# Patient Record
Sex: FEMALE | Race: WHITE
[De-identification: names, ages, dates, MRNs, and addresses within clinical notes are randomized per-mention and may not be internally consistent; named-entity substitution may affect disease eponyms.]

---

## 2019-09-11 ENCOUNTER — HOSPITAL ENCOUNTER (OUTPATIENT)
Dept: HOSPITAL 53 - M SMT | Age: 64
End: 2019-09-11
Attending: INTERNAL MEDICINE

## 2019-09-11 DIAGNOSIS — Z02.71: Primary | ICD-10-CM

## 2019-09-11 NOTE — REP
LUMBOSACRAL SPINE, AP AND LATERAL:

 

Three AP and lateral views of the lumbosacral spine are performed. There is no

compression fracture. There is mild anterior listhesis of L4 on L5 due to

posterior facet arthropathy. There is mild diffuse spurring. There is moderate

disc space narrowing with subchondral sclerosis at L2-3. There is sclerosis and

spurring at the posterior facet joints at L4-5 and particularly L5-S1. The

posterior elements are intact. There is curvature of the thoracolumbar spine,

convex to the left.

 

IMPRESSION:

 

Degenerative changes as above.

 

 

Electronically Signed by

Jm Boles MD 09/12/2019 10:49 A